# Patient Record
Sex: FEMALE | Employment: OTHER | URBAN - METROPOLITAN AREA
[De-identification: names, ages, dates, MRNs, and addresses within clinical notes are randomized per-mention and may not be internally consistent; named-entity substitution may affect disease eponyms.]

---

## 2024-07-18 ENCOUNTER — MEDICAL CORRESPONDENCE (OUTPATIENT)
Dept: HEALTH INFORMATION MANAGEMENT | Facility: CLINIC | Age: 64
End: 2024-07-18
Payer: COMMERCIAL

## 2024-07-19 ENCOUNTER — TRANSCRIBE ORDERS (OUTPATIENT)
Dept: OTHER | Age: 64
End: 2024-07-19

## 2024-07-19 DIAGNOSIS — R49.8 VOCAL TREMOR: Primary | ICD-10-CM

## 2024-08-13 NOTE — TELEPHONE ENCOUNTER
FUTURE VISIT INFORMATION      FUTURE VISIT INFORMATION:  Date: 8/26/24  Time: 2:30 PM  [place] [facility] [place] [facility] [place]  REFERRAL INFORMATION:  Referring provider:    Referring providers clinic:    Reason for visit/diagnosis:  Vocal tremor, apt per Pt, [place]     RECORDS REQUESTED FROM      Clinic name Comments Records Status Imaging Status   [facility] NEUROLOGY   7/18/24 OV note/ referral - Jonatan Dickinson MD  CE

## 2024-08-20 ENCOUNTER — TELEPHONE (OUTPATIENT)
Dept: OTOLARYNGOLOGY | Facility: CLINIC | Age: 64
End: 2024-08-20
Payer: COMMERCIAL

## 2024-08-26 ENCOUNTER — PRE VISIT (OUTPATIENT)
Dept: OTOLARYNGOLOGY | Facility: CLINIC | Age: 64
End: 2024-08-26